# Patient Record
Sex: FEMALE | Race: WHITE | NOT HISPANIC OR LATINO
[De-identification: names, ages, dates, MRNs, and addresses within clinical notes are randomized per-mention and may not be internally consistent; named-entity substitution may affect disease eponyms.]

---

## 2024-07-24 ENCOUNTER — APPOINTMENT (OUTPATIENT)
Dept: ORTHOPEDIC SURGERY | Facility: CLINIC | Age: 39
End: 2024-07-24

## 2024-07-24 DIAGNOSIS — S93.322A: ICD-10-CM

## 2024-07-24 DIAGNOSIS — Z00.00 ENCOUNTER FOR GENERAL ADULT MEDICAL EXAMINATION W/OUT ABNORMAL FINDINGS: ICD-10-CM

## 2024-07-24 DIAGNOSIS — S92.242A DISPLACED FRACTURE OF MEDIAL CUNEIFORM OF LEFT FOOT, INITIAL ENCOUNTER FOR CLOSED FRACTURE: ICD-10-CM

## 2024-07-24 PROCEDURE — 73630 X-RAY EXAM OF FOOT: CPT | Mod: LT

## 2024-07-24 PROCEDURE — 99204 OFFICE O/P NEW MOD 45 MIN: CPT | Mod: 25

## 2024-07-24 NOTE — DATA REVIEWED
[Outside X-rays] : outside x-rays [MRI] : MRI [Left] : left [Foot] : foot [I reviewed the films/CD and additionally noted] : I reviewed the films/CD and additionally noted [FreeTextEntry1] : no acute fx [FreeTextEntry2] : lisfranc injury -- no report available -- total ortho

## 2024-07-24 NOTE — PHYSICAL EXAM
[Left] : left foot and ankle [Mild] : mild swelling of dorsal foot [5___] : plantar flexion 5[unfilled]/5 [2+] : dorsalis pedis pulse: 2+ [] : no achilles tendon tenderness [de-identified] :  i walk [[id number]] : dorsiflexion 10 degrees [de-identified] : plantar flexion 30 degrees

## 2024-07-24 NOTE — ASSESSMENT
[FreeTextEntry1] : nwb cam boot discussed surgical and non-surgical options and pros/cons of both all questions answered and would like to proceed with ORIF  The pros, cons, risks, benefits, alternatives have been discussed.  The risks include but are not limited to infection, bleeding, injury to small nerves and blood vessels, pain, stiffness, progression, over correction, under correction, recurrence, hardware failure, need for additional procedures, dvt, PE, amputation and death. Expected recovery time was discussed. All the patient's questions were answered and they would like to proceed.

## 2024-07-24 NOTE — IMAGING
[Left] : left foot [Outside films reviewed] : Outside films reviewed [de-identified] : lisfranc widening w/ med cuneiform avulsion

## 2024-07-24 NOTE — HISTORY OF PRESENT ILLNESS
[9] : 9 [8] : 8 [Sharp] : sharp [Constant] : constant [Household chores] : household chores [Leisure] : leisure [de-identified] : 7/24/2024: fall 4 days ago w/ foot pain. went to  and had concern for lisfranc injury. had mri. walking w/ i walk. 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

## 2024-07-25 ENCOUNTER — TRANSCRIPTION ENCOUNTER (OUTPATIENT)
Age: 39
End: 2024-07-25

## 2024-07-26 ENCOUNTER — APPOINTMENT (OUTPATIENT)
Dept: ORTHOPEDIC SURGERY | Facility: HOSPITAL | Age: 39
End: 2024-07-26
Payer: COMMERCIAL

## 2024-07-26 ENCOUNTER — TRANSCRIPTION ENCOUNTER (OUTPATIENT)
Age: 39
End: 2024-07-26

## 2024-07-26 PROCEDURE — 28465 OPTX TARSAL BONE FX EACH: CPT | Mod: 59,RT

## 2024-07-26 PROCEDURE — 29515 APPLICATION SHORT LEG SPLINT: CPT | Mod: 59,RT

## 2024-07-26 PROCEDURE — 28555 REPAIR FOOT DISLOCATION: CPT | Mod: 59,RT

## 2024-07-26 PROCEDURE — 73630 X-RAY EXAM OF FOOT: CPT | Mod: 26,RT

## 2024-07-26 PROCEDURE — 28615 REPAIR FOOT DISLOCATION: CPT | Mod: RT

## 2024-08-07 ENCOUNTER — APPOINTMENT (OUTPATIENT)
Dept: ORTHOPEDIC SURGERY | Facility: CLINIC | Age: 39
End: 2024-08-07

## 2024-08-07 PROBLEM — S92.242D: Status: ACTIVE | Noted: 2024-08-07

## 2024-08-07 PROBLEM — S93.325D: Status: ACTIVE | Noted: 2024-08-07

## 2024-08-07 PROCEDURE — 99024 POSTOP FOLLOW-UP VISIT: CPT

## 2024-08-07 NOTE — HISTORY OF PRESENT ILLNESS
[9] : 9 [8] : 8 [Sharp] : sharp [Constant] : constant [Household chores] : household chores [Leisure] : leisure [de-identified] : 7/24/2024: fall 4 days ago w/ foot pain. went to  and had concern for lisfranc injury. had mri. walking w/ i walk. 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 nwb in splint. Patient denies fevers, chills, or drainage. not taking asa as directed [] : Post Surgical Visit: no [FreeTextEntry6] : swelling

## 2024-08-21 ENCOUNTER — NON-APPOINTMENT (OUTPATIENT)
Age: 39
End: 2024-08-21

## 2024-08-21 ENCOUNTER — APPOINTMENT (OUTPATIENT)
Dept: ORTHOPEDIC SURGERY | Facility: CLINIC | Age: 39
End: 2024-08-21
Payer: COMMERCIAL

## 2024-08-21 ENCOUNTER — TRANSCRIPTION ENCOUNTER (OUTPATIENT)
Age: 39
End: 2024-08-21

## 2024-08-21 DIAGNOSIS — S93.325D DISLOCATION OF TARSOMETATARSAL JOINT OF LEFT FOOT, SUBSEQUENT ENCOUNTER: ICD-10-CM

## 2024-08-21 PROCEDURE — 99024 POSTOP FOLLOW-UP VISIT: CPT

## 2024-08-21 PROCEDURE — 73630 X-RAY EXAM OF FOOT: CPT | Mod: LT

## 2024-08-21 NOTE — HISTORY OF PRESENT ILLNESS
[9] : 9 [8] : 8 [Sharp] : sharp [Constant] : constant [Household chores] : household chores [Leisure] : leisure [de-identified] : 7/24/2024: fall 4 days ago w/ foot pain. went to  and had concern for lisfranc injury. had mri. walking w/ i walk. 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 nwb in splint. Patient denies fevers, chills, or drainage. not taking asa as directed  08/21/2024:  no complaints. nwb in boot. taking asa. Patient denies fevers, chills, or drainage. [] : Post Surgical Visit: no [FreeTextEntry6] : swelling

## 2024-08-21 NOTE — PHYSICAL EXAM
[Left] : left foot and ankle [5___] : plantar flexion 5[unfilled]/5 [2+] : dorsalis pedis pulse: 2+ [] : no calf tenderness [de-identified] : jennifer

## 2024-08-27 ENCOUNTER — APPOINTMENT (OUTPATIENT)
Dept: ORTHOPEDIC SURGERY | Facility: CLINIC | Age: 39
End: 2024-08-27
Payer: COMMERCIAL

## 2024-08-27 ENCOUNTER — RESULT CHARGE (OUTPATIENT)
Age: 39
End: 2024-08-27

## 2024-08-27 PROCEDURE — 73600 X-RAY EXAM OF ANKLE: CPT | Mod: LT

## 2024-08-27 PROCEDURE — 99203 OFFICE O/P NEW LOW 30 MIN: CPT

## 2024-08-27 PROCEDURE — 73630 X-RAY EXAM OF FOOT: CPT | Mod: LT

## 2024-08-27 NOTE — ASSESSMENT
[FreeTextEntry1] : continue NWB in cam boot as instructed ice/elevation nsaids or tylenol prn f/u with Dr Lantigua next week as scheduled she will call the office with any problems or concerns

## 2024-08-27 NOTE — IMAGING
[de-identified] : left foot: mild swelling dorsal foot +tenderness dorsal foot healed incisions, no sign of infection no gross instability noted NVI  xrays left foot 3 views: hardware in good position, films appear unchanged compared to last visits images

## 2024-08-27 NOTE — HISTORY OF PRESENT ILLNESS
[de-identified] : 08/27/24: Pt is a 38 y/o female presenting of left foot pain. Had surgery 07/26/24 with ja. Pt states he fell on her foot with her CAM boot 08/27/24. Pain localized along the top/ medial foot and anterior ankle.  [FreeTextEntry1] : left foot

## 2024-09-04 ENCOUNTER — APPOINTMENT (OUTPATIENT)
Dept: ORTHOPEDIC SURGERY | Facility: CLINIC | Age: 39
End: 2024-09-04
Payer: COMMERCIAL

## 2024-09-04 DIAGNOSIS — S93.325D DISLOCATION OF TARSOMETATARSAL JOINT OF LEFT FOOT, SUBSEQUENT ENCOUNTER: ICD-10-CM

## 2024-09-04 DIAGNOSIS — S92.242D DISPLACED FRACTURE OF MEDIAL CUNEIFORM OF LEFT FOOT, SUBSEQUENT ENCOUNTER FOR FRACTURE WITH ROUTINE HEALING: ICD-10-CM

## 2024-09-04 PROCEDURE — 73630 X-RAY EXAM OF FOOT: CPT | Mod: LT

## 2024-09-04 PROCEDURE — 99024 POSTOP FOLLOW-UP VISIT: CPT

## 2024-09-04 NOTE — PHYSICAL EXAM
[Left] : left foot and ankle [5___] : plantar flexion 5[unfilled]/5 [2+] : dorsalis pedis pulse: 2+ [de-identified] : jennifer [] : no calf tenderness

## 2024-09-04 NOTE — ASSESSMENT
[FreeTextEntry1] : progressive wb back to wbat in boot ice/elevate nsaids prn f/up 3 wks w/ foot xray

## 2024-09-04 NOTE — HISTORY OF PRESENT ILLNESS
[9] : 9 [8] : 8 [Sharp] : sharp [Constant] : constant [Household chores] : household chores [Leisure] : leisure [de-identified] : 7/24/2024: fall 4 days ago w/ foot pain. went to  and had concern for lisfranc injury. had mri. walking w/ i walk. 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 nwb in splint. Patient denies fevers, chills, or drainage. not taking asa as directed  08/21/2024:  no complaints. nwb in boot. taking asa. Patient denies fevers, chills, or drainage.  09/04/2024: had fall since last visit. was wearing boot at time. went to  for eval. Patient denies fevers, chills, or drainage. [] : Post Surgical Visit: no [FreeTextEntry6] : swelling

## 2024-09-25 ENCOUNTER — NON-APPOINTMENT (OUTPATIENT)
Age: 39
End: 2024-09-25

## 2024-09-25 ENCOUNTER — APPOINTMENT (OUTPATIENT)
Dept: ORTHOPEDIC SURGERY | Facility: CLINIC | Age: 39
End: 2024-09-25
Payer: COMMERCIAL

## 2024-09-25 DIAGNOSIS — S93.325D DISLOCATION OF TARSOMETATARSAL JOINT OF LEFT FOOT, SUBSEQUENT ENCOUNTER: ICD-10-CM

## 2024-09-25 DIAGNOSIS — S92.242D DISPLACED FRACTURE OF MEDIAL CUNEIFORM OF LEFT FOOT, SUBSEQUENT ENCOUNTER FOR FRACTURE WITH ROUTINE HEALING: ICD-10-CM

## 2024-09-25 PROCEDURE — 73630 X-RAY EXAM OF FOOT: CPT | Mod: LT

## 2024-09-25 PROCEDURE — 99024 POSTOP FOLLOW-UP VISIT: CPT

## 2024-09-25 NOTE — PHYSICAL EXAM
[Left] : left foot and ankle [5___] : plantar flexion 5[unfilled]/5 [2+] : dorsalis pedis pulse: 2+ [] : patient ambulates without assistive device [FreeTextEntry8] : no ttp

## 2024-09-25 NOTE — HISTORY OF PRESENT ILLNESS
[9] : 9 [8] : 8 [Sharp] : sharp [Constant] : constant [Household chores] : household chores [Leisure] : leisure [de-identified] : 7/24/2024: fall 4 days ago w/ foot pain. went to  and had concern for lisfranc injury. had mri. walking w/ i walk. 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 nwb in splint. Patient denies fevers, chills, or drainage. not taking asa as directed  08/21/2024:  no complaints. nwb in boot. taking asa. Patient denies fevers, chills, or drainage.  09/04/2024: had fall since last visit. was wearing boot at time. went to  for eval. Patient denies fevers, chills, or drainage.  09/25/2024: improving. Wb in boot.  Patient denies fevers, chills, or drainage.  [] : Post Surgical Visit: no [FreeTextEntry6] : swelling

## 2024-09-25 NOTE — ASSESSMENT
[FreeTextEntry1] : wbat cam boot ice/elevate nsaids prn f/up 3 wks w/ foot xray discussed screw removal -- will discuss further at future visit

## 2024-10-17 ENCOUNTER — APPOINTMENT (OUTPATIENT)
Dept: ORTHOPEDIC SURGERY | Facility: CLINIC | Age: 39
End: 2024-10-17
Payer: COMMERCIAL

## 2024-10-17 DIAGNOSIS — S93.325D DISLOCATION OF TARSOMETATARSAL JOINT OF LEFT FOOT, SUBSEQUENT ENCOUNTER: ICD-10-CM

## 2024-10-17 DIAGNOSIS — S92.242D DISPLACED FRACTURE OF MEDIAL CUNEIFORM OF LEFT FOOT, SUBSEQUENT ENCOUNTER FOR FRACTURE WITH ROUTINE HEALING: ICD-10-CM

## 2024-10-17 PROCEDURE — 73630 X-RAY EXAM OF FOOT: CPT | Mod: LT

## 2024-10-17 PROCEDURE — 99024 POSTOP FOLLOW-UP VISIT: CPT

## 2024-11-14 ENCOUNTER — APPOINTMENT (OUTPATIENT)
Dept: ORTHOPEDIC SURGERY | Facility: CLINIC | Age: 39
End: 2024-11-14
Payer: COMMERCIAL

## 2024-11-14 DIAGNOSIS — S93.325D DISLOCATION OF TARSOMETATARSAL JOINT OF LEFT FOOT, SUBSEQUENT ENCOUNTER: ICD-10-CM

## 2024-11-14 DIAGNOSIS — S92.242D DISPLACED FRACTURE OF MEDIAL CUNEIFORM OF LEFT FOOT, SUBSEQUENT ENCOUNTER FOR FRACTURE WITH ROUTINE HEALING: ICD-10-CM

## 2024-11-14 PROCEDURE — 73630 X-RAY EXAM OF FOOT: CPT | Mod: LT

## 2024-11-14 PROCEDURE — 99213 OFFICE O/P EST LOW 20 MIN: CPT

## 2025-01-30 ENCOUNTER — APPOINTMENT (OUTPATIENT)
Dept: ORTHOPEDIC SURGERY | Facility: CLINIC | Age: 40
End: 2025-01-30
Payer: COMMERCIAL

## 2025-01-30 DIAGNOSIS — S93.325D DISLOCATION OF TARSOMETATARSAL JOINT OF LEFT FOOT, SUBSEQUENT ENCOUNTER: ICD-10-CM

## 2025-01-30 DIAGNOSIS — S92.242D DISPLACED FRACTURE OF MEDIAL CUNEIFORM OF LEFT FOOT, SUBSEQUENT ENCOUNTER FOR FRACTURE WITH ROUTINE HEALING: ICD-10-CM

## 2025-01-30 DIAGNOSIS — T84.84XA PAIN DUE TO INTERNAL ORTHOPEDIC PROSTHETIC DEVICES, IMPLANTS AND GRAFTS, INITIAL ENCOUNTER: ICD-10-CM

## 2025-01-30 PROCEDURE — 73630 X-RAY EXAM OF FOOT: CPT | Mod: LT

## 2025-01-30 PROCEDURE — 99214 OFFICE O/P EST MOD 30 MIN: CPT | Mod: 25

## 2025-02-24 PROBLEM — S92.902A UNSPECIFIED FRACTURE OF LEFT FOOT, INITIAL ENCOUNTER FOR CLOSED FRACTURE: Chronic | Status: ACTIVE | Noted: 2024-07-25

## 2025-03-25 ENCOUNTER — OUTPATIENT (OUTPATIENT)
Dept: OUTPATIENT SERVICES | Facility: HOSPITAL | Age: 40
LOS: 1 days | End: 2025-03-25
Payer: COMMERCIAL

## 2025-03-25 VITALS
TEMPERATURE: 98 F | HEIGHT: 64.5 IN | OXYGEN SATURATION: 100 % | RESPIRATION RATE: 15 BRPM | SYSTOLIC BLOOD PRESSURE: 114 MMHG | HEART RATE: 65 BPM | DIASTOLIC BLOOD PRESSURE: 72 MMHG | WEIGHT: 145.95 LBS

## 2025-03-25 DIAGNOSIS — Z01.818 ENCOUNTER FOR OTHER PREPROCEDURAL EXAMINATION: ICD-10-CM

## 2025-03-25 DIAGNOSIS — Z98.890 OTHER SPECIFIED POSTPROCEDURAL STATES: Chronic | ICD-10-CM

## 2025-03-25 DIAGNOSIS — T85.848A PAIN DUE TO OTHER INTERNAL PROSTHETIC DEVICES, IMPLANTS AND GRAFTS, INITIAL ENCOUNTER: ICD-10-CM

## 2025-03-25 DIAGNOSIS — T84.84XA PAIN DUE TO INTERNAL ORTHOPEDIC PROSTHETIC DEVICES, IMPLANTS AND GRAFTS, INITIAL ENCOUNTER: ICD-10-CM

## 2025-03-25 PROBLEM — S92.902A UNSPECIFIED FRACTURE OF LEFT FOOT, INITIAL ENCOUNTER FOR CLOSED FRACTURE: Chronic | Status: INACTIVE | Noted: 2024-07-25 | Resolved: 2025-03-25

## 2025-03-25 LAB
HCT VFR BLD CALC: 38.9 % — SIGNIFICANT CHANGE UP (ref 34.5–45)
HGB BLD-MCNC: 12.7 G/DL — SIGNIFICANT CHANGE UP (ref 11.5–15.5)
MCHC RBC-ENTMCNC: 29.3 PG — SIGNIFICANT CHANGE UP (ref 27–34)
MCHC RBC-ENTMCNC: 32.6 G/DL — SIGNIFICANT CHANGE UP (ref 32–36)
MCV RBC AUTO: 89.8 FL — SIGNIFICANT CHANGE UP (ref 80–100)
NRBC BLD AUTO-RTO: 0 /100 WBCS — SIGNIFICANT CHANGE UP (ref 0–0)
PLATELET # BLD AUTO: 280 K/UL — SIGNIFICANT CHANGE UP (ref 150–400)
RBC # BLD: 4.33 M/UL — SIGNIFICANT CHANGE UP (ref 3.8–5.2)
RBC # FLD: 11.9 % — SIGNIFICANT CHANGE UP (ref 10.3–14.5)
WBC # BLD: 7.77 K/UL — SIGNIFICANT CHANGE UP (ref 3.8–10.5)
WBC # FLD AUTO: 7.77 K/UL — SIGNIFICANT CHANGE UP (ref 3.8–10.5)

## 2025-03-25 PROCEDURE — 85027 COMPLETE CBC AUTOMATED: CPT

## 2025-03-25 PROCEDURE — G0463: CPT

## 2025-03-25 PROCEDURE — 36415 COLL VENOUS BLD VENIPUNCTURE: CPT

## 2025-03-25 NOTE — H&P PST ADULT - NSICDXPASTSURGICALHX_GEN_ALL_CORE_FT
PAST SURGICAL HISTORY:  H/O dilation and curettage     History of open reduction and internal fixation (ORIF) procedure

## 2025-03-25 NOTE — H&P PST ADULT - HISTORY OF PRESENT ILLNESS
41 yo female is s/p ORIF left foot July 2024.  She is now scheduled for remova lof hardware left foot on 4/11/2025.

## 2025-03-25 NOTE — H&P PST ADULT - GENERAL
Curette Text: Prior to application of cantharidin the lesions were lightly pared with a curette. Curette Before Application?: No Medical Necessity Information: It is in your best interest to select a reason for this procedure from the list below. All of these items fulfill various CMS LCD requirements except the new and changing color options. Strength: Canthacur Post-Care Instructions: I reviewed with the patient in detail post-care instructions. The patient understands that the treated areas should be washed off 1 hour after application. Medical Necessity Clause: This procedure was medically necessary because the lesions that were treated were: Detail Level: Detailed Consent: The patient's consent was obtained including but not limited to risks of crusting, scabbing, scarring, blistering, darker or lighter pigmentary change, recurrence, incomplete removal and infection. negative

## 2025-04-10 ENCOUNTER — TRANSCRIPTION ENCOUNTER (OUTPATIENT)
Age: 40
End: 2025-04-10

## 2025-04-10 NOTE — ASU DISCHARGE PLAN (ADULT/PEDIATRIC) - CARE PROVIDER_API CALL
Bacilio Lantigua  Orthopaedic Surgery  [address] [place] [address]  Phone: [phone]  Fax: [phone]  Follow Up Time: 2 weeks

## 2025-04-10 NOTE — ASU DISCHARGE PLAN (ADULT/PEDIATRIC) - FINANCIAL ASSISTANCE
[facility] provides services at a reduced cost to those who are determined to be eligible through [facility]’s financial assistance program. Information regarding [facility]’s financial assistance program can be found by going to [email or web address] or by calling [phone].

## 2025-04-10 NOTE — ASU DISCHARGE PLAN (ADULT/PEDIATRIC) - ACTIVITY LEVEL
No exercise/No heavy lifting/No sports/gym/No weight bearing/Elevate extremity No exercise/No heavy lifting/No sports/gym/No weight bearing/Elevate extremity/No tub baths

## 2025-04-10 NOTE — ASU DISCHARGE PLAN (ADULT/PEDIATRIC) - ASU DC SPECIAL INSTRUCTIONSFT
Non weight bearing left lower extremity, Ambulate with crutches or a walker  Elevate Left foot on blankets above heart level (toes above the nose) for 1 hour 3 times a day.  Apply ice pack to left foot for 30 minutes every 3 hours daily.  Keep bandage clean and dry daily.  Cover Left foot in shower daily with plastic bag & tape ( or cast bag ) to keep dry.  Wear protective shoe on Left foot daily  See the Surgeon in the office in 10-14 days.  Call the Surgeon for fever, severe foot pain. weight bearing as tolerated left lower extremity, Ambulate with crutches or a walker  Elevate Left foot on blankets above heart level (toes above the nose) for 1 hour 3 times a day.  Apply ice pack to left foot for 30 minutes every 3 hours daily.  Keep bandage clean and dry daily.  Cover Left foot in shower daily with plastic bag & tape ( or cast bag ) to keep dry.  Wear protective shoe on Left foot daily  See the Surgeon in the office in 10-14 days.  Call the Surgeon for fever, severe foot pain.

## 2025-04-11 ENCOUNTER — TRANSCRIPTION ENCOUNTER (OUTPATIENT)
Age: 40
End: 2025-04-11

## 2025-04-11 ENCOUNTER — RESULT REVIEW (OUTPATIENT)
Age: 40
End: 2025-04-11

## 2025-04-11 ENCOUNTER — OUTPATIENT (OUTPATIENT)
Dept: OUTPATIENT SERVICES | Facility: HOSPITAL | Age: 40
LOS: 1 days | End: 2025-04-11
Payer: COMMERCIAL

## 2025-04-11 ENCOUNTER — APPOINTMENT (OUTPATIENT)
Dept: ORTHOPEDIC SURGERY | Facility: HOSPITAL | Age: 40
End: 2025-04-11
Payer: COMMERCIAL

## 2025-04-11 VITALS
HEART RATE: 65 BPM | SYSTOLIC BLOOD PRESSURE: 115 MMHG | HEIGHT: 64 IN | OXYGEN SATURATION: 100 % | RESPIRATION RATE: 11 BRPM | WEIGHT: 152.78 LBS | TEMPERATURE: 97 F | DIASTOLIC BLOOD PRESSURE: 70 MMHG

## 2025-04-11 VITALS
RESPIRATION RATE: 14 BRPM | HEART RATE: 62 BPM | DIASTOLIC BLOOD PRESSURE: 68 MMHG | SYSTOLIC BLOOD PRESSURE: 122 MMHG | OXYGEN SATURATION: 100 %

## 2025-04-11 DIAGNOSIS — Z98.890 OTHER SPECIFIED POSTPROCEDURAL STATES: Chronic | ICD-10-CM

## 2025-04-11 DIAGNOSIS — T84.84XA PAIN DUE TO INTERNAL ORTHOPEDIC PROSTHETIC DEVICES, IMPLANTS AND GRAFTS, INITIAL ENCOUNTER: ICD-10-CM

## 2025-04-11 LAB — HCG UR QL: NEGATIVE — SIGNIFICANT CHANGE UP

## 2025-04-11 PROCEDURE — 20680 REMOVAL OF IMPLANT DEEP: CPT | Mod: LT

## 2025-04-11 PROCEDURE — 81025 URINE PREGNANCY TEST: CPT

## 2025-04-11 PROCEDURE — 88300 SURGICAL PATH GROSS: CPT

## 2025-04-11 PROCEDURE — 97161 PT EVAL LOW COMPLEX 20 MIN: CPT

## 2025-04-11 PROCEDURE — 73630 X-RAY EXAM OF FOOT: CPT | Mod: 26,LT

## 2025-04-11 PROCEDURE — 88300 SURGICAL PATH GROSS: CPT | Mod: 26

## 2025-04-11 PROCEDURE — 76000 FLUOROSCOPY <1 HR PHYS/QHP: CPT

## 2025-04-11 RX ORDER — ONDANSETRON HCL/PF 4 MG/2 ML
4 VIAL (ML) INJECTION ONCE
Refills: 0 | Status: COMPLETED | OUTPATIENT
Start: 2025-04-11 | End: 2025-04-11

## 2025-04-11 RX ORDER — APREPITANT 40 MG/1
40 CAPSULE ORAL ONCE
Refills: 0 | Status: COMPLETED | OUTPATIENT
Start: 2025-04-11 | End: 2025-04-11

## 2025-04-11 RX ORDER — HYDROMORPHONE/SOD CHLOR,ISO/PF 2 MG/10 ML
0.5 SYRINGE (ML) INJECTION
Refills: 0 | Status: DISCONTINUED | OUTPATIENT
Start: 2025-04-11 | End: 2025-04-11

## 2025-04-11 RX ORDER — CEFAZOLIN SODIUM IN 0.9 % NACL 3 G/100 ML
2000 INTRAVENOUS SOLUTION, PIGGYBACK (ML) INTRAVENOUS ONCE
Refills: 0 | Status: COMPLETED | OUTPATIENT
Start: 2025-04-11 | End: 2025-04-11

## 2025-04-11 RX ORDER — SODIUM CHLORIDE 9 G/1000ML
1000 INJECTION, SOLUTION INTRAVENOUS
Refills: 0 | Status: DISCONTINUED | OUTPATIENT
Start: 2025-04-11 | End: 2025-04-11

## 2025-04-11 RX ORDER — HYDROMORPHONE/SOD CHLOR,ISO/PF 2 MG/10 ML
1 SYRINGE (ML) INJECTION
Refills: 0 | Status: DISCONTINUED | OUTPATIENT
Start: 2025-04-11 | End: 2025-04-11

## 2025-04-11 RX ORDER — OXYCODONE HYDROCHLORIDE 30 MG/1
5 TABLET ORAL ONCE
Refills: 0 | Status: DISCONTINUED | OUTPATIENT
Start: 2025-04-11 | End: 2025-04-11

## 2025-04-11 RX ORDER — HYDROCODONE BITARTRATE AND ACETAMINOPHEN 5; 325 MG/1; MG/1
1 TABLET ORAL
Qty: 5 | Refills: 0
Start: 2025-04-11

## 2025-04-11 RX ADMIN — Medication 4 MILLIGRAM(S): at 08:45

## 2025-04-11 RX ADMIN — Medication 1 APPLICATION(S): at 06:39

## 2025-04-11 RX ADMIN — APREPITANT 40 MILLIGRAM(S): 40 CAPSULE ORAL at 07:08

## 2025-04-11 RX ADMIN — SODIUM CHLORIDE 75 MILLILITER(S): 9 INJECTION, SOLUTION INTRAVENOUS at 08:45

## 2025-04-11 NOTE — BRIEF OPERATIVE NOTE - ANTIBIOTIC PROTOCOL
Followed protocol Where Do You Want The Question To Include Opioid Counseling Located?: Case Summary Tab

## 2025-04-11 NOTE — PHYSICAL THERAPY INITIAL EVALUATION ADULT - NSACTIVITYREC_GEN_A_PT
Pt is independent with transfers and ambulation with axillary crutch. Educated pt on LLE WBAT, pt able to maintain throughout. Educated pt on ascending/descending stairs with +HR and axillary crutch or axillary crutch. Provided pt with educational handout. Pt verbalized understanding/agreement to above recommendations/education. All pts questions answered to pt satisfaction. Pt requires no skilled PT and is cleared from PT services for dc home.

## 2025-04-11 NOTE — PHYSICAL THERAPY INITIAL EVALUATION ADULT - PERTINENT HX OF CURRENT PROBLEM, REHAB EVAL
Pt is a 41 y/o female with left foot painful internal orthopedic hardware. Pt is s/p left foot removal of hardware on 4/11/25.

## 2025-04-11 NOTE — PHYSICAL THERAPY INITIAL EVALUATION ADULT - ADDITIONAL COMMENTS
Pt lives with  and kids in a private house, there are 2 stairs to enter and 4 stairs +HR to the primary bedroom/bathroom. PTA pt was independent with ADLs and ambulation. Pt owns b/l axillary crutches.

## 2025-04-11 NOTE — ASU PATIENT PROFILE, ADULT - FALL HARM RISK - FALL HARM RISK
[employer] account # [de-identified]  Amy Subramanian name on account.    [phone] No indicators present

## 2025-04-11 NOTE — ASU PATIENT PROFILE, ADULT - FALL HARM RISK - UNIVERSAL INTERVENTIONS
Bed in lowest position, wheels locked, appropriate side rails in place/Call bell, personal items and telephone in reach/Instruct patient to call for assistance before getting out of bed or chair/Non-slip footwear when patient is out of bed/[place] to call system/Physically safe environment - no spills, clutter or unnecessary equipment/Purposeful Proactive Rounding/Room/bathroom lighting operational, light cord in reach

## 2025-04-11 NOTE — ASU PATIENT PROFILE, ADULT - NSTOBACCONEVERSMOKERY/N_GEN_A
The skin of the bilateral groins was clipped, prepped and draped in the usual sterile manner. (If not otherwise specified, skin prep was bilateral.)  No

## 2025-04-24 ENCOUNTER — APPOINTMENT (OUTPATIENT)
Dept: ORTHOPEDIC SURGERY | Facility: CLINIC | Age: 40
End: 2025-04-24
Payer: COMMERCIAL

## 2025-04-24 DIAGNOSIS — S93.325D DISLOCATION OF TARSOMETATARSAL JOINT OF LEFT FOOT, SUBSEQUENT ENCOUNTER: ICD-10-CM

## 2025-04-24 DIAGNOSIS — S92.242D DISPLACED FRACTURE OF MEDIAL CUNEIFORM OF LEFT FOOT, SUBSEQUENT ENCOUNTER FOR FRACTURE WITH ROUTINE HEALING: ICD-10-CM

## 2025-04-24 PROCEDURE — 99024 POSTOP FOLLOW-UP VISIT: CPT

## 2025-04-24 PROCEDURE — 73630 X-RAY EXAM OF FOOT: CPT | Mod: LT

## 2025-04-25 PROBLEM — T85.848A PAIN DUE TO OTHER INTERNAL PROSTHETIC DEVICES, IMPLANTS AND GRAFTS, INITIAL ENCOUNTER: Chronic | Status: ACTIVE | Noted: 2025-03-25

## 2025-05-06 ENCOUNTER — APPOINTMENT (OUTPATIENT)
Dept: ORTHOPEDIC SURGERY | Facility: CLINIC | Age: 40
End: 2025-05-06
Payer: COMMERCIAL

## 2025-05-06 VITALS — BODY MASS INDEX: 24.66 KG/M2 | WEIGHT: 148 LBS | HEIGHT: 65 IN

## 2025-05-06 DIAGNOSIS — S23.9XXA SPRAIN OF UNSPECIFIED PARTS OF THORAX, INITIAL ENCOUNTER: ICD-10-CM

## 2025-05-06 PROCEDURE — 99213 OFFICE O/P EST LOW 20 MIN: CPT

## 2025-05-06 PROCEDURE — 72080 X-RAY EXAM THORACOLMB 2/> VW: CPT

## 2025-05-06 RX ORDER — METHYLPREDNISOLONE 4 MG/1
4 TABLET ORAL
Qty: 1 | Refills: 0 | Status: ACTIVE | COMMUNITY
Start: 2025-05-06 | End: 1900-01-01

## 2025-05-16 ENCOUNTER — APPOINTMENT (OUTPATIENT)
Dept: ORTHOPEDIC SURGERY | Facility: CLINIC | Age: 40
End: 2025-05-16
Payer: COMMERCIAL

## 2025-05-16 DIAGNOSIS — M62.830 MUSCLE SPASM OF BACK: ICD-10-CM

## 2025-05-16 PROCEDURE — 99214 OFFICE O/P EST MOD 30 MIN: CPT

## 2025-06-26 ENCOUNTER — APPOINTMENT (OUTPATIENT)
Dept: ORTHOPEDIC SURGERY | Facility: CLINIC | Age: 40
End: 2025-06-26
Payer: COMMERCIAL

## 2025-06-26 PROCEDURE — 73630 X-RAY EXAM OF FOOT: CPT | Mod: LT

## 2025-06-26 PROCEDURE — 99024 POSTOP FOLLOW-UP VISIT: CPT

## (undated) DEVICE — PACK LOWER EXTREMITY

## (undated) DEVICE — DRSG STERISTRIPS 0.5 X 4"

## (undated) DEVICE — DRAPE IOBAN 23" X 23"

## (undated) DEVICE — DRSG COBAN 6"

## (undated) DEVICE — DRAPE 3/4 SHEET 52X76"

## (undated) DEVICE — DRAPE C ARM C-ARMOUR

## (undated) DEVICE — DRSG ACE BANDAGE 6"

## (undated) DEVICE — DRSG WEBRIL 4"

## (undated) DEVICE — DRSG COMBINE 5 X 9"

## (undated) DEVICE — ELCTR BOVIE TIP BLADE INSULATED 2.75" EDGE

## (undated) DEVICE — SUT MONOCRYL 4-0 18" P-3 UNDYED

## (undated) DEVICE — WARMING BLANKET UPPER ADULT

## (undated) DEVICE — WARMING BLANKET FULL ADULT

## (undated) DEVICE — DRSG XEROFORM 1 X 8"

## (undated) DEVICE — ELCTR STRYKER NEPTUNE SMOKE EVACUATION PENCIL (GREEN)

## (undated) DEVICE — DRSG CURITY GAUZE SPONGE 4 X 4" 12-PLY

## (undated) DEVICE — VENODYNE/SCD SLEEVE CALF MEDIUM

## (undated) DEVICE — GLV 7.5 PROTEXIS (BLUE)

## (undated) DEVICE — DRSG WEBRIL 6"

## (undated) DEVICE — SUT VICRYL PLUS 3-0 27" RB-1 UNDYED

## (undated) DEVICE — DRSG STOCKINETTE CLOTH 6 X 72"